# Patient Record
Sex: MALE | Race: WHITE | NOT HISPANIC OR LATINO | Employment: FULL TIME | URBAN - METROPOLITAN AREA
[De-identification: names, ages, dates, MRNs, and addresses within clinical notes are randomized per-mention and may not be internally consistent; named-entity substitution may affect disease eponyms.]

---

## 2024-08-05 ENCOUNTER — APPOINTMENT (OUTPATIENT)
Facility: HOSPITAL | Age: 64
End: 2024-08-05
Payer: COMMERCIAL

## 2024-08-05 ENCOUNTER — HOSPITAL ENCOUNTER (EMERGENCY)
Facility: HOSPITAL | Age: 64
Discharge: HOME | End: 2024-08-05
Attending: EMERGENCY MEDICINE | Admitting: EMERGENCY MEDICINE
Payer: COMMERCIAL

## 2024-08-05 VITALS
TEMPERATURE: 98.4 F | OXYGEN SATURATION: 100 % | HEIGHT: 72 IN | HEART RATE: 80 BPM | SYSTOLIC BLOOD PRESSURE: 130 MMHG | WEIGHT: 209.44 LBS | BODY MASS INDEX: 28.37 KG/M2 | DIASTOLIC BLOOD PRESSURE: 80 MMHG | RESPIRATION RATE: 16 BRPM

## 2024-08-05 DIAGNOSIS — S70.12XA TRAUMATIC ECCHYMOSIS OF LEFT THIGH, INITIAL ENCOUNTER: ICD-10-CM

## 2024-08-05 DIAGNOSIS — S76.302A UNSPECIFIED INJURY OF MUSCLE, FASCIA AND TENDON OF THE POSTERIOR MUSCLE GROUP AT THIGH LEVEL, LEFT THIGH, INITIAL ENCOUNTER: Primary | ICD-10-CM

## 2024-08-05 PROCEDURE — 73502 X-RAY EXAM HIP UNI 2-3 VIEWS: CPT

## 2024-08-05 PROCEDURE — 25010000002 KETOROLAC TROMETHAMINE PER 15 MG

## 2024-08-05 PROCEDURE — 99283 EMERGENCY DEPT VISIT LOW MDM: CPT

## 2024-08-05 PROCEDURE — 96372 THER/PROPH/DIAG INJ SC/IM: CPT

## 2024-08-05 RX ORDER — KETOROLAC TROMETHAMINE 30 MG/ML
30 INJECTION, SOLUTION INTRAMUSCULAR; INTRAVENOUS EVERY 6 HOURS PRN
Status: DISCONTINUED | OUTPATIENT
Start: 2024-08-05 | End: 2024-08-05

## 2024-08-05 RX ORDER — KETOROLAC TROMETHAMINE 10 MG/1
10 TABLET, FILM COATED ORAL EVERY 6 HOURS PRN
Qty: 12 TABLET | Refills: 0 | Status: SHIPPED | OUTPATIENT
Start: 2024-08-05 | End: 2024-08-08

## 2024-08-05 RX ORDER — KETOROLAC TROMETHAMINE 30 MG/ML
30 INJECTION, SOLUTION INTRAMUSCULAR; INTRAVENOUS ONCE
Status: DISCONTINUED | OUTPATIENT
Start: 2024-08-05 | End: 2024-08-05

## 2024-08-05 RX ORDER — KETOROLAC TROMETHAMINE 30 MG/ML
30 INJECTION, SOLUTION INTRAMUSCULAR; INTRAVENOUS ONCE
Status: COMPLETED | OUTPATIENT
Start: 2024-08-05 | End: 2024-08-05

## 2024-08-05 RX ORDER — KETOROLAC TROMETHAMINE 30 MG/ML
30 INJECTION, SOLUTION INTRAMUSCULAR; INTRAVENOUS ONCE AS NEEDED
Status: DISCONTINUED | OUTPATIENT
Start: 2024-08-05 | End: 2024-08-05

## 2024-08-05 RX ADMIN — KETOROLAC TROMETHAMINE 30 MG: 30 INJECTION, SOLUTION INTRAMUSCULAR at 21:48

## 2024-08-05 NOTE — Clinical Note
[facility] EMERGENCY DEPARTMENT [place]  [address]  [facility] [address]  Phone: [phone]  Fax: [phone]    Itz Rubio was seen and treated in our emergency department on 8/5/2024.  He may return to work on 08/08/2024.         Thank you for choosing [facility].    Pillo Adan PA-C

## 2024-08-06 NOTE — FSED PROVIDER NOTE
"Subjective  History of Present Illness:    Patient presents the ED with complaints of LLE pain.  Patient states that on Saturday, he was docking his boat when he slipped.  States he landed on his posterior upper left thigh/left buttock.  Patient has had pain since then.  States that the area started to bruise and bruise has become more prominent.  Has pain with movement of his left leg.  States that laying supine makes the pain worse.  Patient is able to ambulate.  Patient complains of intermittent tingling in his left toes.  Denies any prior injury to left hip or left upper leg.  Denies any other symptoms or concerns at this time.    Nurses Notes reviewed and agree, including vitals, allergies, social history and prior medical history.     REVIEW OF SYSTEMS: All systems reviewed and not pertinent unless noted.  Review of Systems   Musculoskeletal:  Positive for myalgias.   Skin:  Positive for color change.   All other systems reviewed and are negative.      No past medical history on file.    Allergies:    Advair hfa [fluticasone-salmeterol]      No past surgical history on file.      Social History     Socioeconomic History    Marital status:          No family history on file.    Objective  Physical Exam:  /88   Pulse 88   Temp 98.2 °F (36.8 °C)   Resp 18   Ht 183 cm (72.05\")   Wt 95 kg (209 lb 7 oz)   SpO2 95%   BMI 28.37 kg/m²      Physical Exam  Constitutional:       General: He is not in acute distress.     Appearance: Normal appearance. He is not ill-appearing.   HENT:      Head: Normocephalic and atraumatic.   Cardiovascular:      Rate and Rhythm: Normal rate and regular rhythm.   Pulmonary:      Effort: Pulmonary effort is normal. No respiratory distress.      Breath sounds: Normal breath sounds.   Abdominal:      General: Abdomen is flat. Bowel sounds are normal.      Palpations: Abdomen is soft.   Musculoskeletal:         General: Swelling present. Normal range of motion.      " Cervical back: Normal range of motion and neck supple.   Skin:     Findings: Bruising (On left posterior upper thigh and left buttocks) present.             Comments: Bruising and tenderness present   Neurological:      General: No focal deficit present.      Mental Status: He is alert and oriented to person, place, and time.   Psychiatric:         Mood and Affect: Mood normal.         Behavior: Behavior normal.       Procedures    ED Course:         Lab Results (last 24 hours)       ** No results found for the last 24 hours. **             XR Hip With or Without Pelvis 2 - 3 View Left    Result Date: 8/5/2024  XR HIP W OR WO PELVIS 2-3 VIEW LEFT Date of Exam: 8/5/2024 7:53 PM EDT Indication: fall with injury Comparison: None available. Findings: The proximal femurs are intact. The bony pelvis is intact. The pubic bones are intact. The sacroiliac joints are normal. The lower lumbar spine is normal. The hip joints are maintained.     Impression: No acute fracture or dislocation. Electronically Signed: Kvng Elena MD  8/5/2024 9:08 PM EDT  Workstation ID: [id number]        [facility]     Amount and/or Complexity of Data Reviewed  Tests in the radiology section of CPT®: reviewed      Patient presented to the ED with complaints of pain and ecchymosis on the left posterior upper thigh and left buttocks after suffering fall injury Saturday night.  Patient hip XR was not of concern.  On examination, patient had large ecchymosis and tenderness in area.  Provided patient with dose of Toradol which improved his symptoms.  Advised patient to follow-up with PCP for further evaluation and to return to ED if symptoms worsen.  Provided patient with prescription for Toradol.  Patient was agreeable to plan and discharge.    Medications   ketorolac (TORADOL) injection 30 mg (30 mg Intramuscular Given 8/5/24 7518)     -----  ED Disposition       ED Disposition   Discharge    Condition   Stable    Comment   --             Final diagnoses:    Unspecified injury of muscle, fascia and tendon of the posterior muscle group at thigh level, left thigh, initial encounter   Traumatic ecchymosis of left thigh, initial encounter      Your Follow-Up Providers       Lamin Pederson MD. Schedule an appointment as soon as possible for a visit in 1 week.    Specialty: Internal Medicine  Follow up details: As needed, If symptoms worsen  [id number] [phone]                       Contact information for after-discharge care    Follow-up information has not been specified.                    Your medication list        START taking these medications        Instructions Last Dose Given Next Dose Due   ketorolac 10 MG tablet  Commonly known as: TORADOL      Take 1 tablet by mouth Every 6 (Six) Hours As Needed for Moderate Pain for up to 3 days.                 Where to Get Your Medications        These medications were sent to [id number] - [place], [place] - [address] [facility] [address] - [phone]  - [phone]   [address] [place] [address], [facility] [address]      Phone: [phone]   ketorolac 10 MG tablet